# Patient Record
Sex: MALE | Race: WHITE
[De-identification: names, ages, dates, MRNs, and addresses within clinical notes are randomized per-mention and may not be internally consistent; named-entity substitution may affect disease eponyms.]

---

## 2018-07-24 ENCOUNTER — HOSPITAL ENCOUNTER (OUTPATIENT)
Dept: HOSPITAL 45 - C.CPL | Age: 48
Discharge: HOME | End: 2018-07-24
Attending: SURGERY
Payer: COMMERCIAL

## 2018-07-24 DIAGNOSIS — Z01.811: ICD-10-CM

## 2018-07-24 DIAGNOSIS — Z01.810: Primary | ICD-10-CM

## 2018-07-24 DIAGNOSIS — Z01.812: ICD-10-CM

## 2018-07-24 DIAGNOSIS — R00.1: ICD-10-CM

## 2018-07-24 LAB
ALBUMIN SERPL-MCNC: 3.9 GM/DL (ref 3.4–5)
ALP SERPL-CCNC: 38 U/L (ref 45–117)
ALT SERPL-CCNC: 44 U/L (ref 12–78)
AST SERPL-CCNC: 16 U/L (ref 15–37)
BASOPHILS # BLD: 0.02 K/UL (ref 0–0.2)
BASOPHILS NFR BLD: 0.3 %
BUN SERPL-MCNC: 19 MG/DL (ref 7–18)
CALCIUM SERPL-MCNC: 8.8 MG/DL (ref 8.5–10.1)
CO2 SERPL-SCNC: 28 MMOL/L (ref 21–32)
CREAT SERPL-MCNC: 1.22 MG/DL (ref 0.6–1.4)
EOS ABS #: 0.09 K/UL (ref 0–0.5)
EOSINOPHIL NFR BLD AUTO: 201 K/UL (ref 130–400)
GLUCOSE SERPL-MCNC: 90 MG/DL (ref 70–99)
HCT VFR BLD CALC: 44.3 % (ref 42–52)
HGB BLD-MCNC: 15.5 G/DL (ref 14–18)
IG#: 0 K/UL (ref 0–0.02)
IMM GRANULOCYTES NFR BLD AUTO: 40.7 %
INR PPP: 1.1 (ref 0.9–1.1)
LYMPHOCYTES # BLD: 2.4 K/UL (ref 1.2–3.4)
MCH RBC QN AUTO: 30 PG (ref 25–34)
MCHC RBC AUTO-ENTMCNC: 35 G/DL (ref 32–36)
MCV RBC AUTO: 85.7 FL (ref 80–100)
MONO ABS #: 0.41 K/UL (ref 0.11–0.59)
MONOCYTES NFR BLD: 6.9 %
NEUT ABS #: 2.98 K/UL (ref 1.4–6.5)
NEUTROPHILS # BLD AUTO: 1.5 %
NEUTROPHILS NFR BLD AUTO: 50.6 %
PMV BLD AUTO: 10 FL (ref 7.4–10.4)
POTASSIUM SERPL-SCNC: 4.2 MMOL/L (ref 3.5–5.1)
PROT SERPL-MCNC: 7.7 GM/DL (ref 6.4–8.2)
PTT PATIENT: 29.4 SECONDS (ref 21–31)
RED CELL DISTRIBUTION WIDTH CV: 12.3 % (ref 11.5–14.5)
RED CELL DISTRIBUTION WIDTH SD: 38.8 FL (ref 36.4–46.3)
SODIUM SERPL-SCNC: 139 MMOL/L (ref 136–145)
WBC # BLD AUTO: 5.9 K/UL (ref 4.8–10.8)

## 2018-07-24 NOTE — PAT MEDICATION INSTRUCTIONS
Service Date


Jul 24, 2018.





Current Home Medication List


Acetaminophen (Tylenol), 1,000 MG PO PRN


Fish Oil (Omega-3), 1 CAP PO QAM


Multivitamin (Multivitamin), 1 TAB PO QAM


Rivaroxaban (Xarelto), 20 MG PO QPM





Medication Instructions


For Your Scheduled Surgery 








- Check with surgeon and prescribing physician for instructions: 


Rivaroxaban (Xarelto), 20 MG PO QPM








- Hold the following medications starting 7/25/18:


Fish Oil ([place]-3), 1 CAP PO QAM








- Hold the following medications the morning of surgery:


Multivitamin (Multivitamin), 1 TAB PO QAM








- Take the following medications the morning of surgery with a sip of water:


Acetaminophen (Tylenol), 1,000 MG PO PRN (okay to take up to 4 hours prior to 

surgery if needed)








- Take the following medications as scheduled the night before surgery:


Acetaminophen (Tylenol), 1,000 MG PO PRN (if needed)








If you have any questions please call us at [phone] or [phone] or 

[phone]

## 2018-07-24 NOTE — DIAGNOSTIC IMAGING REPORT
CHEST 2 VIEWS ROUTINE



CLINICAL HISTORY: Preoperative chest    



COMPARISON STUDY:  5/6/2013



FINDINGS: The heart is normal in size. There is no evidence for failure. There

is stable mild elevation of the left hemidiaphragm. A linear opacity at the left

lung base is felt to represent atelectasis/scarring. There is no focal pulmonary

consolidation. There are no pleural effusions.[ 



IMPRESSION: No active disease in the chest.







Electronically signed by:  Vern Jewell M.D.

7/24/2018 9:30 AM



Dictated Date/Time:  7/24/2018 9:30 AM

## 2018-08-01 ENCOUNTER — HOSPITAL ENCOUNTER (OUTPATIENT)
Dept: HOSPITAL 45 - C.ACU | Age: 48
Discharge: HOME | End: 2018-08-01
Attending: SURGERY
Payer: COMMERCIAL

## 2018-08-01 VITALS
OXYGEN SATURATION: 95 % | SYSTOLIC BLOOD PRESSURE: 137 MMHG | TEMPERATURE: 98.24 F | HEART RATE: 82 BPM | DIASTOLIC BLOOD PRESSURE: 68 MMHG

## 2018-08-01 VITALS
TEMPERATURE: 97.88 F | OXYGEN SATURATION: 96 % | HEART RATE: 73 BPM | DIASTOLIC BLOOD PRESSURE: 73 MMHG | SYSTOLIC BLOOD PRESSURE: 121 MMHG

## 2018-08-01 VITALS
BODY MASS INDEX: 29.35 KG/M2 | WEIGHT: 205.03 LBS | WEIGHT: 205.03 LBS | BODY MASS INDEX: 29.35 KG/M2 | HEIGHT: 70 IN | BODY MASS INDEX: 29.35 KG/M2 | HEIGHT: 70 IN

## 2018-08-01 VITALS
OXYGEN SATURATION: 95 % | DIASTOLIC BLOOD PRESSURE: 73 MMHG | SYSTOLIC BLOOD PRESSURE: 127 MMHG | HEART RATE: 66 BPM | TEMPERATURE: 98.6 F

## 2018-08-01 VITALS
DIASTOLIC BLOOD PRESSURE: 70 MMHG | OXYGEN SATURATION: 93 % | TEMPERATURE: 97.88 F | SYSTOLIC BLOOD PRESSURE: 119 MMHG | HEART RATE: 90 BPM

## 2018-08-01 DIAGNOSIS — K42.9: ICD-10-CM

## 2018-08-01 DIAGNOSIS — K40.20: Primary | ICD-10-CM

## 2018-08-01 DIAGNOSIS — Z86.718: ICD-10-CM

## 2018-08-01 DIAGNOSIS — Z79.01: ICD-10-CM

## 2018-08-01 RX ADMIN — HYDROMORPHONE HYDROCHLORIDE PRN MG: 1 INJECTION, SOLUTION INTRAMUSCULAR; INTRAVENOUS; SUBCUTANEOUS at 14:10

## 2018-08-01 RX ADMIN — HYDROMORPHONE HYDROCHLORIDE PRN MG: 1 INJECTION, SOLUTION INTRAMUSCULAR; INTRAVENOUS; SUBCUTANEOUS at 14:05

## 2018-08-01 RX ADMIN — HYDROMORPHONE HYDROCHLORIDE PRN MG: 1 INJECTION, SOLUTION INTRAMUSCULAR; INTRAVENOUS; SUBCUTANEOUS at 14:15

## 2018-08-01 RX ADMIN — HYDROMORPHONE HYDROCHLORIDE PRN MG: 1 INJECTION, SOLUTION INTRAMUSCULAR; INTRAVENOUS; SUBCUTANEOUS at 14:26

## 2018-08-01 RX ADMIN — HYDROMORPHONE HYDROCHLORIDE PRN MG: 1 INJECTION, SOLUTION INTRAMUSCULAR; INTRAVENOUS; SUBCUTANEOUS at 14:31

## 2018-08-01 RX ADMIN — HYDROMORPHONE HYDROCHLORIDE PRN MG: 1 INJECTION, SOLUTION INTRAMUSCULAR; INTRAVENOUS; SUBCUTANEOUS at 14:36

## 2018-08-01 RX ADMIN — HYDROMORPHONE HYDROCHLORIDE PRN MG: 1 INJECTION, SOLUTION INTRAMUSCULAR; INTRAVENOUS; SUBCUTANEOUS at 14:00

## 2018-08-01 RX ADMIN — HYDROMORPHONE HYDROCHLORIDE PRN MG: 1 INJECTION, SOLUTION INTRAMUSCULAR; INTRAVENOUS; SUBCUTANEOUS at 14:20

## 2018-08-01 NOTE — ANESTHESIOLOGY PROGRESS NOTE
Anesthesia Post Op Note


Date & Time


Aug 1, 2018 at 15:15





Vital Signs


Pain Intensity:  4





Vital Signs Past 12 Hours








  Date Time  Temp Pulse Resp B/P (MAP) Pulse Ox O2 Delivery O2 Flow Rate FiO2


 


8/1/18 14:50 36.4 67 16 131/69 96 Nasal Cannula 2 


 


8/1/18 14:40  66 16 124/73 91 Room Air  


 


8/1/18 14:30  72 16 135/74 96 Nasal Cannula 2 


 


8/1/18 14:20  64 16 114/72 95 Nasal Cannula 2 


 


8/1/18 14:10  61 16 138/76 100 Oxymask 10 


 


8/1/18 14:00  63 16 140/81 98 Oxymask 10 


 


8/1/18 13:54 36.4 70 16 131/85 98 Oxymask 10 


 


8/1/18 08:32 36.6 73 18 121/73 (89) 96 Room Air  











Notes


Mental Status:  alert / awake / arousable, participated in evaluation


Pt Amnestic to Procedure:  Yes


Nausea / Vomiting:  adequately controlled


Pain:  adequately controlled


Airway Patency, RR, SpO2:  stable & adequate


BP & HR:  stable & adequate


Hydration State:  stable & adequate


Anesthetic Complications:  no major complications apparent

## 2018-08-01 NOTE — DISCHARGE INSTRUCTIONS
Discharge Instructions


Date of Service


Aug 1, 2018.





Visit


Reason for Visit:  Bilateral Inguinal Hernia, Umbilical Hernia





Discharge


Discharge Diagnosis / Problem:  hernia repairs





Discharge Goals


Goal(s):  Decrease discomfort





Activity Recommendations


Activity Limitations:  as noted below


Lifting Limitations:  no more than 10 pounds


Shower/Bathe:  no limitations


Driving or Machine Use:  1 week





Anesthesia


.





Post Anesthesia Instructions:





If you have had General Anesthesia or IV Sedation:





*  Do not drive today.


*  Resume driving when surgeon permits.


*  Do not make important decisions or sign legal documents today.


*  Call surgeon for:





   1.  Temperature elevations greater than 101 degrees F.


   2.  Uncontrollable pain.


   3.  Excessive bleeding.


   4.  Persistent nausea and vomiting.


   5.  Medication intolerance (nausea, vomiting or rash).





*  For nausea and vomiting use only clear liquids such as: tea, soda, bouillon 

until nausea subsides, then gradually increase diet as tolerated.





*  If you have any concerns or questions, call your surgeon's office.  If 

physician is unavailable and it is an emergency, call 911 or go to the nearest 

emergency room.





.





Instructions / Follow-Up


Instructions / Follow-Up


[facility] in 1-2 weeks, call [phone] if you have any questions


Restart Xarelto on Friday





Diet Recommendations


Recommended Home Diet:  no limitations





Procedures


Procedures Performed:  


Bilateral Laparoscopic Inguinal Hernia Repair; Open Umbilical Hernia Repair 

with 


Mesh





Pending Studies


Studies pending at discharge:  no





Medical Emergencies








.


Who to Call and When:





Medical Emergencies:  If at any time you feel your situation is an emergency, 

please call 911 immediately.





.





Non-Emergent Contact


Non-Emergency issues call your:  Surgeon


Call Non-Emergent contact if:  you have a fever, temperature is above 101.5, 

your pain is not controlled, wound has increased redness, wound has increased 

pain, you have any medication questions





.


.








"Provider Documentation" section prepared by Shelton Romo.








.

## 2018-08-01 NOTE — [facility] POST OPERATIVE BRIEF NOTE
Immediate Operative Summary


Operative Date


Aug 1, 2018.





Pre-Operative Diagnosis





Bilateral Inguinal Hernias





Post-Operative Diagnosis





Same





Procedure(s) Performed





Bilateral Laparoscopic Inguinal Hernia Repair; Open Umbilical Hernia Repair 

with 


Mesh





Surgeon


Dr Weller





Assistant Surgeon(s)


Shelton Romo PA-C





Estimated Blood Loss


9ML





Findings


Consistent with Post-Op Diagnosis


Bilateral indirect inguinal hernias.  Umbilical defect closed primarily





Specimens





None





Drains


None





Anesthesia Type


General





Complication(s)


none





Disposition


Accompanied Pt To Recover:  no


Disposition:  Recovery Room / PACU

## 2018-08-01 NOTE — [facility] OPERATIVE REPORT
Operative Report


Operative Date


Aug 1, 2018.





Pre-Operative Diagnosis





Bilateral Inguinal Hernias, umbilical hernia





Post-Operative Diagnosis


Bilateral indirect inguinal hernias, umbilical hernia





Procedure(s) Performed


Laparoscopic bilateral inguinal hernia repairs; open umbilical hernia repair (

primary)





Surgeon


Dr Weller





Assistant Surgeon(s)


Shelton Romo PA-C





Estimated Blood Loss


9ML





Findings


Bilateral indirect inguinal hernias with cord lipomas.  Small tear in 

peritoneum closed with clips.  Pro- mesh placed bilaterally.  A 1.5 cm 

umbilical defect closed primarily.





Specimens





None





Drains


None





Anesthesia


General





Complication(s)


None





Disposition


Recovery Room / PACU





Indications


47-year-old male with bilateral inguinal hernias and umbilical hernia, plan for 

laparoscopic bilateral inguinal hernia repair, open umbilical hernia repair.  

The risks of the procedure were discussed, all questions were answered, and the 

patient agreed to proceed with surgery as planned.





Description of Procedure


The patient was properly identified, consented, and taken to the operating room 

where he was placed in the supine position.  General endotracheal anesthesia 

was induced.  SCDs and a safety belt were placed.  A choi catheter was placed.

  Preoperative antibiotics were administered.  The patient's groins and abdomen 

were prepped and draped in the standard sterile fashion.  Surgical timeout was 

performed and all parties were in agreement that this was the correct patient 

and procedure to be performed and we continued as planned.





A curvilinear infraumbilical incision was made with electrocautery and deepened 

down to the fascia with blunt dissection.  A transverse incision was made in 

the anterior rectus sheath on the right.  The rectus muscle was pulled 

laterally exposing the posterior rectus sheath.  A large Linda was used to 

bluntly dissect the preperitoneal space down to the pubic symphysis.  This was 

then replaced with a laparoscopic preperitoneal dissection balloon, which was 

inflated under direct visualization and held in place for approximately 30 

seconds.  This was then removed and the preperitoneal space was insufflated 

with carbon dioxide which the patient tolerated without incident.  Two 5 mm 

ports were then placed in the midline.


Dissection started on the right, beginning laterally at the anterior superior 

iliac spine. Mann's ligament was then dissected medially. The cord structures 

were circumferentially dissected.  A small indirect inguinal hernia and 

moderate size cord lipoma were noted.  They were dissected away from the cord 

structures.  The contralateral side was then dissected in a similar manner, and 

a small indirect inguinal hernia and cord lipoma were noted and reduced.  Both 

hernias were very scarred to the cord.  A tear in the peritoneum was closed 

with 5 mm clips.  Progrip mesh was placed on the bilateral and covered the 

direct, indirect, and femoral spaces.  The mesh was held in place, the ports 

were removed, and the space was allowed to collapse.  The anterior rectus 

sheath fascia was closed with 0 Vicryl suture.  





The umbilical stalk was circumferentially dissected with a Linda, and divided 

below the level of the skin.  A 1.5 cm fascial defect was encountered.  The 

hernia was reduced.  The fascia anteriorly and posteriorly was cleared of 

investing tissue for several centimeters. Hemostasis was achieved within the 

wound.  The hernia defect was closed primarily with interrupted 0 Nurolon 

sutures.





The wound was irrigated and hemostasis confirmed. The umbilicus was tacked down 

to the fascia with 3-0 Vicryl sutures.  Local anesthetic in the form of 0.5% 

Marcaine was injected in the fascia and along the skin incision.  The skin was 

closed with interrupted 3-0 Vicryl deep dermal sutures, followed by 4-0 

Monocryl running subcuticular suture.  The skin of all port sites were closed 

with 4-0 Monocryl subcuticular suture, and Dermabond was placed over the 

incisions.





The patient was extubated in the operating room and taken to the PACU for 

recovery without apparent incident.  Any air in the scrotum was reduced, and 

the testicles were confirmed to be in the scrotum.  All sponge, instrument, and 

needle counts were correct at the conclusion of the procedure.  The patient 

tolerated the procedure well.





The physician's assistant was present and scrubbed for the entire case.  He was 

essential in positioning the patient, prepping and draping, retraction and 

exposure, driving the laparoscop, repair the hernias and placement of mesh, 

closure the incisions, placement of the dressings.


I attest to the content of the Intraoperative Record and any orders documented 

therein.  Any exceptions are noted below.